# Patient Record
Sex: FEMALE | Race: ASIAN
[De-identification: names, ages, dates, MRNs, and addresses within clinical notes are randomized per-mention and may not be internally consistent; named-entity substitution may affect disease eponyms.]

---

## 2021-02-15 ENCOUNTER — DASHBOARD ENCOUNTERS (OUTPATIENT)
Age: 46
End: 2021-02-15

## 2021-02-15 ENCOUNTER — OFFICE VISIT (OUTPATIENT)
Dept: URBAN - METROPOLITAN AREA CLINIC 128 | Facility: CLINIC | Age: 46
End: 2021-02-15
Payer: COMMERCIAL

## 2021-02-15 ENCOUNTER — WEB ENCOUNTER (OUTPATIENT)
Dept: URBAN - METROPOLITAN AREA CLINIC 128 | Facility: CLINIC | Age: 46
End: 2021-02-15

## 2021-02-15 DIAGNOSIS — K59.01 CONSTIPATION BY DELAYED COLONIC TRANSIT: ICD-10-CM

## 2021-02-15 DIAGNOSIS — R10.31 RLQ ABDOMINAL PAIN: ICD-10-CM

## 2021-02-15 PROBLEM — 35298007: Status: ACTIVE | Noted: 2021-02-15

## 2021-02-15 PROCEDURE — 99244 OFF/OP CNSLTJ NEW/EST MOD 40: CPT | Performed by: INTERNAL MEDICINE

## 2021-02-15 PROCEDURE — 99204 OFFICE O/P NEW MOD 45 MIN: CPT | Performed by: INTERNAL MEDICINE

## 2021-02-15 PROCEDURE — G8484 FLU IMMUNIZE NO ADMIN: HCPCS | Performed by: INTERNAL MEDICINE

## 2021-02-15 NOTE — HPI-OTHER HISTORIES
Mrs. French is a 45 year old female who was referred by Dr. Riddhi Melendrez for abdominal pain and constipation. A copy of this note will be sent to her PCP.   Today she reports having RLQ abdominal pain. She reports the pain is intermittent. She reports going to Urgent Care and a x-ray. She reports being told she was constipated and was given laxatives and reports feeling better after having BMs. She reports continuing to have RLQ abdominal tenderness.   She reports typically having a BM once a day but reports not feeling appropriately emptied out. She reports trying fiber without benefit. She reports her endocrinologist is maintaining thyroid levels.   She reports her mother was diagnosed with stage 4 colon cancer at age 74.   Mrs. French reports in 2019 having thyroid cancer which was treated with surgery and radiation therapy.   She had an EGD on 5/15/2009 which showed esophagitis, mild gastritis, and 2 mm gastric fundus polyp.   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 Gastric biopsies were negative for H. pylori. Duodenal biopsies were unremarkable.   CT on 5/23/2009 showed "3 x 2 cm in size left ovarian dermoid containing fat and calcifications and oval left sided collecting system."

## 2021-03-08 ENCOUNTER — CLAIMS CREATED FROM THE CLAIM WINDOW (OUTPATIENT)
Dept: URBAN - METROPOLITAN AREA CLINIC 4 | Facility: CLINIC | Age: 46
End: 2021-03-08
Payer: COMMERCIAL

## 2021-03-08 ENCOUNTER — OFFICE VISIT (OUTPATIENT)
Dept: URBAN - METROPOLITAN AREA SURGERY CENTER 31 | Facility: SURGERY CENTER | Age: 46
End: 2021-03-08
Payer: COMMERCIAL

## 2021-03-08 DIAGNOSIS — K59.00 CONSTIPATION: ICD-10-CM

## 2021-03-08 DIAGNOSIS — K63.89 MASS OF HEPATIC FLEXURE OF COLON: ICD-10-CM

## 2021-03-08 DIAGNOSIS — R10.31 ABDOMINAL CRAMPING IN RIGHT LOWER QUADRANT: ICD-10-CM

## 2021-03-08 PROCEDURE — 88342 IMHCHEM/IMCYTCHM 1ST ANTB: CPT | Performed by: PATHOLOGY

## 2021-03-08 PROCEDURE — 45380 COLONOSCOPY AND BIOPSY: CPT | Performed by: INTERNAL MEDICINE

## 2021-03-08 PROCEDURE — 88313 SPECIAL STAINS GROUP 2: CPT | Performed by: PATHOLOGY

## 2021-03-08 PROCEDURE — 88305 TISSUE EXAM BY PATHOLOGIST: CPT | Performed by: PATHOLOGY

## 2021-03-08 PROCEDURE — G8907 PT DOC NO EVENTS ON DISCHARG: HCPCS | Performed by: INTERNAL MEDICINE

## 2021-03-18 ENCOUNTER — OFFICE VISIT (OUTPATIENT)
Dept: URBAN - METROPOLITAN AREA CLINIC 19 | Facility: CLINIC | Age: 46
End: 2021-03-18

## 2021-03-29 ENCOUNTER — OFFICE VISIT (OUTPATIENT)
Dept: URBAN - METROPOLITAN AREA CLINIC 128 | Facility: CLINIC | Age: 46
End: 2021-03-29

## 2021-05-03 ENCOUNTER — OFFICE VISIT (OUTPATIENT)
Dept: URBAN - METROPOLITAN AREA CLINIC 128 | Facility: CLINIC | Age: 46
End: 2021-05-03

## 2021-07-12 ENCOUNTER — OFFICE VISIT (OUTPATIENT)
Dept: URBAN - METROPOLITAN AREA CLINIC 128 | Facility: CLINIC | Age: 46
End: 2021-07-12